# Patient Record
Sex: MALE | Race: WHITE | NOT HISPANIC OR LATINO
[De-identification: names, ages, dates, MRNs, and addresses within clinical notes are randomized per-mention and may not be internally consistent; named-entity substitution may affect disease eponyms.]

---

## 2017-02-23 ENCOUNTER — TRANSCRIPTION ENCOUNTER (OUTPATIENT)
Age: 42
End: 2017-02-23

## 2020-01-30 ENCOUNTER — TRANSCRIPTION ENCOUNTER (OUTPATIENT)
Age: 45
End: 2020-01-30

## 2020-02-22 ENCOUNTER — OUTPATIENT (OUTPATIENT)
Dept: OUTPATIENT SERVICES | Facility: HOSPITAL | Age: 45
LOS: 1 days | End: 2020-02-22

## 2020-04-25 ENCOUNTER — MESSAGE (OUTPATIENT)
Age: 45
End: 2020-04-25

## 2020-05-01 LAB
SARS-COV-2 IGG SERPL IA-ACNC: <0.1 INDEX
SARS-COV-2 IGG SERPL QL IA: NEGATIVE

## 2020-05-05 ENCOUNTER — APPOINTMENT (OUTPATIENT)
Dept: DISASTER EMERGENCY | Facility: CLINIC | Age: 45
End: 2020-05-05

## 2021-05-24 ENCOUNTER — OUTPATIENT (OUTPATIENT)
Dept: OUTPATIENT SERVICES | Facility: HOSPITAL | Age: 46
LOS: 1 days | End: 2021-05-24
Payer: COMMERCIAL

## 2021-05-24 DIAGNOSIS — S83.92XA SPRAIN OF UNSPECIFIED SITE OF LEFT KNEE, INITIAL ENCOUNTER: ICD-10-CM

## 2021-05-24 PROCEDURE — 73721 MRI JNT OF LWR EXTRE W/O DYE: CPT | Mod: 26,LT

## 2021-05-24 PROCEDURE — 73721 MRI JNT OF LWR EXTRE W/O DYE: CPT

## 2021-06-21 ENCOUNTER — OUTPATIENT (OUTPATIENT)
Dept: OUTPATIENT SERVICES | Facility: HOSPITAL | Age: 46
LOS: 1 days | End: 2021-06-21
Payer: COMMERCIAL

## 2021-06-21 VITALS
DIASTOLIC BLOOD PRESSURE: 79 MMHG | WEIGHT: 212.08 LBS | HEIGHT: 69 IN | OXYGEN SATURATION: 97 % | HEART RATE: 72 BPM | TEMPERATURE: 97 F | RESPIRATION RATE: 16 BRPM | SYSTOLIC BLOOD PRESSURE: 110 MMHG

## 2021-06-21 DIAGNOSIS — S83.204D OTHER TEAR OF UNSPECIFIED MENISCUS, CURRENT INJURY, LEFT KNEE, SUBSEQUENT ENCOUNTER: ICD-10-CM

## 2021-06-21 DIAGNOSIS — Z01.818 ENCOUNTER FOR OTHER PREPROCEDURAL EXAMINATION: ICD-10-CM

## 2021-06-21 DIAGNOSIS — S83.282A OTHER TEAR OF LATERAL MENISCUS, CURRENT INJURY, LEFT KNEE, INITIAL ENCOUNTER: ICD-10-CM

## 2021-06-21 LAB
ANION GAP SERPL CALC-SCNC: 13 MMOL/L — SIGNIFICANT CHANGE UP (ref 5–17)
BUN SERPL-MCNC: 18 MG/DL — SIGNIFICANT CHANGE UP (ref 7–23)
CALCIUM SERPL-MCNC: 9.6 MG/DL — SIGNIFICANT CHANGE UP (ref 8.4–10.5)
CHLORIDE SERPL-SCNC: 104 MMOL/L — SIGNIFICANT CHANGE UP (ref 96–108)
CO2 SERPL-SCNC: 20 MMOL/L — LOW (ref 22–31)
CREAT SERPL-MCNC: 0.74 MG/DL — SIGNIFICANT CHANGE UP (ref 0.5–1.3)
GLUCOSE SERPL-MCNC: 106 MG/DL — HIGH (ref 70–99)
HCT VFR BLD CALC: 44.1 % — SIGNIFICANT CHANGE UP (ref 39–50)
HGB BLD-MCNC: 14.8 G/DL — SIGNIFICANT CHANGE UP (ref 13–17)
MCHC RBC-ENTMCNC: 29.6 PG — SIGNIFICANT CHANGE UP (ref 27–34)
MCHC RBC-ENTMCNC: 33.6 GM/DL — SIGNIFICANT CHANGE UP (ref 32–36)
MCV RBC AUTO: 88.2 FL — SIGNIFICANT CHANGE UP (ref 80–100)
NRBC # BLD: 0 /100 WBCS — SIGNIFICANT CHANGE UP (ref 0–0)
PLATELET # BLD AUTO: 213 K/UL — SIGNIFICANT CHANGE UP (ref 150–400)
POTASSIUM SERPL-MCNC: 3.9 MMOL/L — SIGNIFICANT CHANGE UP (ref 3.5–5.3)
POTASSIUM SERPL-SCNC: 3.9 MMOL/L — SIGNIFICANT CHANGE UP (ref 3.5–5.3)
RBC # BLD: 5 M/UL — SIGNIFICANT CHANGE UP (ref 4.2–5.8)
RBC # FLD: 13 % — SIGNIFICANT CHANGE UP (ref 10.3–14.5)
SODIUM SERPL-SCNC: 137 MMOL/L — SIGNIFICANT CHANGE UP (ref 135–145)
WBC # BLD: 4.49 K/UL — SIGNIFICANT CHANGE UP (ref 3.8–10.5)
WBC # FLD AUTO: 4.49 K/UL — SIGNIFICANT CHANGE UP (ref 3.8–10.5)

## 2021-06-21 PROCEDURE — 85027 COMPLETE CBC AUTOMATED: CPT

## 2021-06-21 PROCEDURE — 80048 BASIC METABOLIC PNL TOTAL CA: CPT

## 2021-06-21 PROCEDURE — G0463: CPT

## 2021-06-21 RX ORDER — LIDOCAINE HCL 20 MG/ML
0.2 VIAL (ML) INJECTION ONCE
Refills: 0 | Status: DISCONTINUED | OUTPATIENT
Start: 2021-06-24 | End: 2021-07-09

## 2021-06-21 RX ORDER — SODIUM CHLORIDE 9 MG/ML
3 INJECTION INTRAMUSCULAR; INTRAVENOUS; SUBCUTANEOUS EVERY 8 HOURS
Refills: 0 | Status: DISCONTINUED | OUTPATIENT
Start: 2021-06-24 | End: 2021-07-09

## 2021-06-21 RX ORDER — CHLORHEXIDINE GLUCONATE 213 G/1000ML
1 SOLUTION TOPICAL ONCE
Refills: 0 | Status: DISCONTINUED | OUTPATIENT
Start: 2021-06-24 | End: 2021-07-09

## 2021-06-21 NOTE — H&P PST ADULT - NSICDXFAMILYHX_GEN_ALL_CORE_FT
FAMILY HISTORY:  Father  Still living? Unknown  FH: lung cancer, Age at diagnosis: Age Unknown    Grandparent  Still living? Unknown  FH: lung cancer, Age at diagnosis: Age Unknown

## 2021-06-21 NOTE — H&P PST ADULT - URINARY CATHETER
Triamcinolone last filled 9-8-16 for 30g with 0 refills  Last seen 9-8-16  No future appointment     Ok to refill?   no

## 2021-06-21 NOTE — H&P PST ADULT - NSICDXPROBLEM_GEN_ALL_CORE_FT
PROBLEM DIAGNOSES  Problem: Other tear of lateral meniscus of left knee  Assessment and Plan:        PROBLEM DIAGNOSES  Problem: Other tear of lateral meniscus of left knee  Assessment and Plan: -scheduled left knee arthroscopy, partial meniscectomy on 6/24  -preop instructions given  -soap instructions given  -labs: CBC, BMP done in pst

## 2021-06-21 NOTE — H&P PST ADULT - TEMPERATURE IN CELSIUS (DEGREES C)
-- Message is from the [facility]--    Reason for Call: [phone] [facility] 121.00     Caller Information       Type Contact Phone    05/13/2019 12:41 PM Phone (Incoming) Pietro Coronado (Other) [phone]     \zykia          Alternative phone number:     Turnaround time given to caller:   \"This message will be sent to [state Provider's name]. The clinical team will fulfill your request as soon as they review your message.\"     36.1

## 2021-06-21 NOTE — H&P PST ADULT - HISTORY OF PRESENT ILLNESS
46 yr old male with PMH of  46 yr old male with no pertinent hx, otherwise healthy. Pt c/o left knee pain since October 2020, c/o sharp intermittent pain with increased activity, (6/10, with activity), states he use ibuprofen occasionally for pain control. Pt evaluated by Dr. Awad for a scheduled Left Knee Arthroscopy, partial meniscectomy on 6/24/21. Pt denies recent travel or sick contact.     **Proof of Covid vaccination in chart

## 2021-06-23 ENCOUNTER — TRANSCRIPTION ENCOUNTER (OUTPATIENT)
Age: 46
End: 2021-06-23

## 2021-06-24 ENCOUNTER — OUTPATIENT (OUTPATIENT)
Dept: OUTPATIENT SERVICES | Facility: HOSPITAL | Age: 46
LOS: 1 days | End: 2021-06-24
Payer: COMMERCIAL

## 2021-06-24 VITALS
TEMPERATURE: 99 F | HEART RATE: 80 BPM | SYSTOLIC BLOOD PRESSURE: 108 MMHG | DIASTOLIC BLOOD PRESSURE: 76 MMHG | OXYGEN SATURATION: 98 % | RESPIRATION RATE: 17 BRPM | WEIGHT: 212.08 LBS | HEIGHT: 69 IN

## 2021-06-24 VITALS
SYSTOLIC BLOOD PRESSURE: 126 MMHG | RESPIRATION RATE: 16 BRPM | DIASTOLIC BLOOD PRESSURE: 74 MMHG | TEMPERATURE: 97 F | OXYGEN SATURATION: 100 % | HEART RATE: 79 BPM

## 2021-06-24 DIAGNOSIS — S83.204D OTHER TEAR OF UNSPECIFIED MENISCUS, CURRENT INJURY, LEFT KNEE, SUBSEQUENT ENCOUNTER: ICD-10-CM

## 2021-06-24 PROCEDURE — 29881 ARTHRS KNE SRG MNISECTMY M/L: CPT | Mod: LT

## 2021-06-24 NOTE — ASU DISCHARGE PLAN (ADULT/PEDIATRIC) - NURSING INSTRUCTIONS
You were given Tylenol during your visit, the next dose of Tylenol will be on or after _____10:30 PM______ ,today/tonight and every 6 hours afterwards for pain management, do not take any Tylenol containing products until this time. Do not exceed more than 4000mg of Tylenol in one 24 hour setting. If no contraindications, you may alternate with Ibuprofen or Naproxen 3 hours after dose of Tylenol. Ibuprofen can be taken every 6 hours. Naproxen may be taken every 12 hours.

## 2021-06-24 NOTE — ASU DISCHARGE PLAN (ADULT/PEDIATRIC) - CARE PROVIDER_API CALL
Pro Awad)  Orthopaedic Surgery  [address] [place] [address]  Phone: [phone]  Fax: [phone]  Follow Up Time: 1 week

## 2021-11-18 ENCOUNTER — APPOINTMENT (OUTPATIENT)
Dept: SURGERY | Facility: CLINIC | Age: 46
End: 2021-11-18
Payer: COMMERCIAL

## 2021-11-18 VITALS
HEIGHT: 69 IN | TEMPERATURE: 97.7 F | OXYGEN SATURATION: 97 % | SYSTOLIC BLOOD PRESSURE: 124 MMHG | HEART RATE: 76 BPM | WEIGHT: 210 LBS | RESPIRATION RATE: 16 BRPM | DIASTOLIC BLOOD PRESSURE: 84 MMHG | BODY MASS INDEX: 31.1 KG/M2

## 2021-11-18 DIAGNOSIS — S83.209A UNSPECIFIED TEAR OF UNSPECIFIED MENISCUS, CURRENT INJURY, UNSPECIFIED KNEE, INITIAL ENCOUNTER: ICD-10-CM

## 2021-11-18 DIAGNOSIS — Z78.9 OTHER SPECIFIED HEALTH STATUS: ICD-10-CM

## 2021-11-18 PROCEDURE — 99204 OFFICE O/P NEW MOD 45 MIN: CPT

## 2021-11-18 RX ORDER — MULTIVITAMIN
TABLET ORAL
Refills: 0 | Status: ACTIVE | COMMUNITY

## 2021-11-18 NOTE — HISTORY OF PRESENT ILLNESS
[de-identified] : Kirk is a 45 y/o male here for evaluation of a Left inguinal bulge. Pt had the bulge for the past ten years. Bulge is becoming more painful. Bulge is deducible. Bulge has increased in size. Denies hearing bowel sounds. Denies abdominal pain, constipation. He has never had any imaging done of the area.

## 2021-11-18 NOTE — ASSESSMENT
[FreeTextEntry1] : I have discussed with the patient the surgical options for a bilateral inguinal hernia repair I have told him that in my opinion he should have a bilateral laparoscopic repair.  I told the patient that I do not do this repair but my partner Dr. Hackett does and I suggested he make an appointment to see Dr. Hackett.  The patient is at the present time setting up an appointment to see Dr. Hackett for laparoscopic bilateral inguinal hernia repair

## 2021-11-18 NOTE — CONSULT LETTER
[Dear  ___] : Dear  [unfilled], [Consult Letter:] : I had the pleasure of evaluating your patient, [unfilled]. [Please see my note below.] : Please see my note below. [Sincerely,] : Sincerely, [Consult Closing:] : Thank you very much for allowing me to participate in the care of this patient.  If you have any questions, please do not hesitate to contact me. [FreeTextEntry3] : I have reviewed all the documentation for this encounter with the patient and have edited where appropriate\par \par Dr. Juan M Silva

## 2021-11-18 NOTE — PHYSICAL EXAM
[Normal Breath Sounds] : Normal breath sounds [Normal Heart Sounds] : normal heart sounds [Oriented to Person] : oriented to person [Oriented to Place] : oriented to place [Oriented to Time] : oriented to time [Calm] : calm [Alert] : not alert [de-identified] : There is a moderate size left inguinal hernia present which is reducible and nontender.  Left cord and testicle are normal.  There is a small right inguinal hernia present also the right cord and testicle are normal. [de-identified] : Normal strength and gait

## 2021-11-19 ENCOUNTER — APPOINTMENT (OUTPATIENT)
Dept: SURGERY | Facility: CLINIC | Age: 46
End: 2021-11-19
Payer: COMMERCIAL

## 2021-11-19 PROCEDURE — 99214 OFFICE O/P EST MOD 30 MIN: CPT

## 2021-11-19 PROCEDURE — 99204 OFFICE O/P NEW MOD 45 MIN: CPT

## 2021-11-19 NOTE — REVIEW OF SYSTEMS
[Abdominal Pain] : abdominal pain [Negative] : Heme/Lymph [Vomiting] : no vomiting [Constipation] : no constipation [Diarrhea] : no diarrhea [Heartburn] : no heartburn [Melena] : no melena [Dysuria] : no dysuria [Incontinence] : no incontinence [Hesitancy] : no urinary hesitancy [Nocturia] : no nocturia [Genital Lesion] : no genital lesions [Testicular Pain] : no testicular pain [FreeTextEntry7] : left groin  [FreeTextEntry8] : left groin discomfort

## 2021-11-19 NOTE — REASON FOR VISIT
[Consultation] : a consultation visit [FreeTextEntry1] : Seen Dr. Silva referred for laparoscopic bilateral inguinal hernia repair

## 2021-11-19 NOTE — PHYSICAL EXAM
[Normal Breath Sounds] : Normal breath sounds [Normal Heart Sounds] : normal heart sounds [Normal Rate and Rhythm] : normal rate and rhythm [No HSM] : no hepatosplenomegaly [No Rash or Lesion] : No rash or lesion [Alert] : alert [Oriented to Person] : oriented to person [Oriented to Place] : oriented to place [Oriented to Time] : oriented to time [Calm] : calm [Abdominal Masses] : No abdominal masses [Abdomen Tenderness] : ~T ~M No abdominal tenderness [de-identified] : well nourished,not in distress [de-identified] : anicteric [de-identified] : no LN [de-identified] : n/a [de-identified] : reducible left > right inguinal uncomplicated herniae.b/l testes palpable.No Ing LN  [de-identified] : deferred  [de-identified] : good power and strength

## 2021-11-19 NOTE — ASSESSMENT
[FreeTextEntry1] : 46Y M with B/L inguinal Herniae .\par L Symptomatic Inguinal hernia\par Right Inguinal Hernia[+]\par Has never had a colonoscopy \par Agreeable for a Laparoscopic Bilateral Inguinal Hernia Repair after a screening colonoscopy \par \par Agree with full plan above discussed at length with the patient all of his questions were answered\par Power Hackett MD, FACS, FASMBS\par Chief Division of Minimally Invasive Surgery\par Co-Director of Bariatric Surgery \par [facility] [address]

## 2021-11-19 NOTE — PLAN
[FreeTextEntry1] : SCreening Colonoscopy\par B/L TEP Lap Inguinal Herniae Repairs  after the colonoscopy.\par All his questions answered\par The risks and benefits of the operation have been explained with his full understanding

## 2021-11-19 NOTE — HISTORY OF PRESENT ILLNESS
[de-identified] : Kirk is a 47 y/o male here for consultation for laparoscopic bilateral inguinal hernia. \par He says that he has had this left groin bulge for almost 10 years ,but;has become bothersome recently\par The right groin has always been asymptomatic\par No other h/o systemic symptoms [de-identified] : He was recently seen by our partner.\par No changes in history in  the interim

## 2021-12-29 ENCOUNTER — RESULT REVIEW (OUTPATIENT)
Age: 46
End: 2021-12-29

## 2022-01-25 ENCOUNTER — OUTPATIENT (OUTPATIENT)
Dept: OUTPATIENT SERVICES | Facility: HOSPITAL | Age: 47
LOS: 1 days | End: 2022-01-25
Payer: COMMERCIAL

## 2022-01-25 VITALS
SYSTOLIC BLOOD PRESSURE: 108 MMHG | RESPIRATION RATE: 16 BRPM | HEIGHT: 69 IN | WEIGHT: 220.02 LBS | DIASTOLIC BLOOD PRESSURE: 75 MMHG | TEMPERATURE: 98 F | OXYGEN SATURATION: 95 % | HEART RATE: 76 BPM

## 2022-01-25 DIAGNOSIS — K40.20 BILATERAL INGUINAL HERNIA, WITHOUT OBSTRUCTION OR GANGRENE, NOT SPECIFIED AS RECURRENT: ICD-10-CM

## 2022-01-25 DIAGNOSIS — Z98.890 OTHER SPECIFIED POSTPROCEDURAL STATES: Chronic | ICD-10-CM

## 2022-01-25 DIAGNOSIS — Z01.818 ENCOUNTER FOR OTHER PREPROCEDURAL EXAMINATION: ICD-10-CM

## 2022-01-25 LAB
ANION GAP SERPL CALC-SCNC: 12 MMOL/L — SIGNIFICANT CHANGE UP (ref 5–17)
BUN SERPL-MCNC: 20 MG/DL — SIGNIFICANT CHANGE UP (ref 7–23)
CALCIUM SERPL-MCNC: 9.5 MG/DL — SIGNIFICANT CHANGE UP (ref 8.4–10.5)
CHLORIDE SERPL-SCNC: 103 MMOL/L — SIGNIFICANT CHANGE UP (ref 96–108)
CO2 SERPL-SCNC: 22 MMOL/L — SIGNIFICANT CHANGE UP (ref 22–31)
CREAT SERPL-MCNC: 0.77 MG/DL — SIGNIFICANT CHANGE UP (ref 0.5–1.3)
GLUCOSE SERPL-MCNC: 96 MG/DL — SIGNIFICANT CHANGE UP (ref 70–99)
HCT VFR BLD CALC: 45.1 % — SIGNIFICANT CHANGE UP (ref 39–50)
HGB BLD-MCNC: 15.3 G/DL — SIGNIFICANT CHANGE UP (ref 13–17)
MCHC RBC-ENTMCNC: 29.8 PG — SIGNIFICANT CHANGE UP (ref 27–34)
MCHC RBC-ENTMCNC: 33.9 GM/DL — SIGNIFICANT CHANGE UP (ref 32–36)
MCV RBC AUTO: 87.7 FL — SIGNIFICANT CHANGE UP (ref 80–100)
NRBC # BLD: 0 /100 WBCS — SIGNIFICANT CHANGE UP (ref 0–0)
PLATELET # BLD AUTO: 242 K/UL — SIGNIFICANT CHANGE UP (ref 150–400)
POTASSIUM SERPL-MCNC: 4 MMOL/L — SIGNIFICANT CHANGE UP (ref 3.5–5.3)
POTASSIUM SERPL-SCNC: 4 MMOL/L — SIGNIFICANT CHANGE UP (ref 3.5–5.3)
RBC # BLD: 5.14 M/UL — SIGNIFICANT CHANGE UP (ref 4.2–5.8)
RBC # FLD: 12.6 % — SIGNIFICANT CHANGE UP (ref 10.3–14.5)
SODIUM SERPL-SCNC: 137 MMOL/L — SIGNIFICANT CHANGE UP (ref 135–145)
WBC # BLD: 4.12 K/UL — SIGNIFICANT CHANGE UP (ref 3.8–10.5)
WBC # FLD AUTO: 4.12 K/UL — SIGNIFICANT CHANGE UP (ref 3.8–10.5)

## 2022-01-25 PROCEDURE — G0463: CPT

## 2022-01-25 PROCEDURE — 85027 COMPLETE CBC AUTOMATED: CPT

## 2022-01-25 PROCEDURE — 80048 BASIC METABOLIC PNL TOTAL CA: CPT

## 2022-01-25 RX ORDER — SODIUM CHLORIDE 9 MG/ML
1000 INJECTION, SOLUTION INTRAVENOUS
Refills: 0 | Status: DISCONTINUED | OUTPATIENT
Start: 2022-02-03 | End: 2022-02-17

## 2022-01-25 RX ORDER — SODIUM CHLORIDE 9 MG/ML
3 INJECTION INTRAMUSCULAR; INTRAVENOUS; SUBCUTANEOUS EVERY 8 HOURS
Refills: 0 | Status: DISCONTINUED | OUTPATIENT
Start: 2022-02-03 | End: 2022-02-17

## 2022-01-25 RX ORDER — LIDOCAINE HCL 20 MG/ML
0.2 VIAL (ML) INJECTION ONCE
Refills: 0 | Status: DISCONTINUED | OUTPATIENT
Start: 2022-02-03 | End: 2022-02-17

## 2022-01-25 NOTE — H&P PST ADULT - MUSCULOSKELETAL
detailed exam negative ROM intact/no joint swelling/no joint erythema/no joint warmth/no calf tenderness/normal strength

## 2022-01-25 NOTE — H&P PST ADULT - PROBLEM SELECTOR PLAN 1
Laparoscopic bilateral Inguinal Hernia Repair  -cbc, bmp done at [facility]  -medical evaluation --scheduled 1/25  -preop instructions given

## 2022-01-25 NOTE — H&P PST ADULT - URINARY CATHETER
no US w/ chronic dvt, pt recalls hx of dvt in the past.  inquired about lasix regimen, instructed pt to follow up with PMD regarding possibly restarting lasix V-Y Plasty Text: The defect edges were debeveled with a #15 scalpel blade.  Given the location of the defect, shape of the defect and the proximity to free margins an V-Y advancement flap was deemed most appropriate.  Using a sterile surgical marker, an appropriate advancement flap was drawn incorporating the defect and placing the expected incisions within the relaxed skin tension lines where possible.    The area thus outlined was incised deep to adipose tissue with a #15 scalpel blade.  The skin margins were undermined to an appropriate distance in all directions utilizing iris scissors.

## 2022-01-25 NOTE — H&P PST ADULT - FALL HARM RISK - UNIVERSAL INTERVENTIONS
Bed in lowest position, wheels locked, appropriate side rails in place/Call bell, personal items and telephone in reach/Instruct patient to call for assistance before getting out of bed or chair/Non-slip footwear when patient is out of bed/[place] to call system/Physically safe environment - no spills, clutter or unnecessary equipment/Purposeful Proactive Rounding/Room/bathroom lighting operational, light cord in reach

## 2022-01-25 NOTE — H&P PST ADULT - HISTORY OF PRESENT ILLNESS
46 year old male with PMH of Diverticulitis in 2019 with complaint of left groin bulge for 10 years and most recently noted right groin bulge. He endorses that     preop covid swab 1/31 @ [facility] 46 year old male with PMH of Diverticulosis/Diverticulitis with complaint of left groin bulge for 10 years and most recently noted right groin bulge. He endorses that the right groin hernia is asymptomatic, however the left groin bulge has become bothersome over the last several months. He denies fever, chills, nausea/vomiting, abdominal pain, unintentional changes in weight, dark or bloody stools, he also denies prior COVID-19 infection. He presents to PST today for evaluation prior to scheduled bilateral Inguinal Hernia Repair on 2/3/2022.    preop covid swab 1/31 @ [facility]

## 2022-01-25 NOTE — H&P PST ADULT - NSICDXPASTMEDICALHX_GEN_ALL_CORE_FT
PAST MEDICAL HISTORY:  Left knee pain      PAST MEDICAL HISTORY:  Bilateral inguinal hernia     Diverticulitis -2019    Left knee pain

## 2022-01-26 PROBLEM — K40.20 BILATERAL INGUINAL HERNIA, WITHOUT OBSTRUCTION OR GANGRENE, NOT SPECIFIED AS RECURRENT: Chronic | Status: ACTIVE | Noted: 2022-01-25

## 2022-01-26 PROBLEM — K57.92 DIVERTICULITIS OF INTESTINE, PART UNSPECIFIED, WITHOUT PERFORATION OR ABSCESS WITHOUT BLEEDING: Chronic | Status: ACTIVE | Noted: 2022-01-25

## 2022-01-26 PROBLEM — M25.562 PAIN IN LEFT KNEE: Chronic | Status: ACTIVE | Noted: 2021-06-21

## 2022-01-31 ENCOUNTER — OUTPATIENT (OUTPATIENT)
Dept: OUTPATIENT SERVICES | Facility: HOSPITAL | Age: 47
LOS: 1 days | End: 2022-01-31
Payer: COMMERCIAL

## 2022-01-31 DIAGNOSIS — Z11.52 ENCOUNTER FOR SCREENING FOR COVID-19: ICD-10-CM

## 2022-01-31 DIAGNOSIS — Z98.890 OTHER SPECIFIED POSTPROCEDURAL STATES: Chronic | ICD-10-CM

## 2022-01-31 LAB — SARS-COV-2 RNA SPEC QL NAA+PROBE: SIGNIFICANT CHANGE UP

## 2022-01-31 PROCEDURE — U0003: CPT

## 2022-01-31 PROCEDURE — U0005: CPT

## 2022-01-31 PROCEDURE — C9803: CPT

## 2022-02-02 ENCOUNTER — TRANSCRIPTION ENCOUNTER (OUTPATIENT)
Age: 47
End: 2022-02-02

## 2022-02-03 ENCOUNTER — OUTPATIENT (OUTPATIENT)
Dept: OUTPATIENT SERVICES | Facility: HOSPITAL | Age: 47
LOS: 1 days | End: 2022-02-03
Payer: COMMERCIAL

## 2022-02-03 ENCOUNTER — APPOINTMENT (OUTPATIENT)
Dept: SURGERY | Facility: HOSPITAL | Age: 47
End: 2022-02-03
Payer: COMMERCIAL

## 2022-02-03 VITALS
HEART RATE: 79 BPM | DIASTOLIC BLOOD PRESSURE: 78 MMHG | TEMPERATURE: 98 F | SYSTOLIC BLOOD PRESSURE: 121 MMHG | HEIGHT: 69 IN | OXYGEN SATURATION: 97 % | WEIGHT: 220.02 LBS | RESPIRATION RATE: 16 BRPM

## 2022-02-03 VITALS
OXYGEN SATURATION: 95 % | TEMPERATURE: 97 F | RESPIRATION RATE: 18 BRPM | DIASTOLIC BLOOD PRESSURE: 62 MMHG | HEART RATE: 60 BPM | SYSTOLIC BLOOD PRESSURE: 105 MMHG

## 2022-02-03 DIAGNOSIS — K40.20 BILATERAL INGUINAL HERNIA, WITHOUT OBSTRUCTION OR GANGRENE, NOT SPECIFIED AS RECURRENT: ICD-10-CM

## 2022-02-03 DIAGNOSIS — Z98.890 OTHER SPECIFIED POSTPROCEDURAL STATES: Chronic | ICD-10-CM

## 2022-02-03 PROCEDURE — 49650 LAP ING HERNIA REPAIR INIT: CPT | Mod: 50

## 2022-02-03 PROCEDURE — C9399: CPT

## 2022-02-03 PROCEDURE — C1727: CPT

## 2022-02-03 PROCEDURE — C1889: CPT

## 2022-02-03 PROCEDURE — 49650 LAP ING HERNIA REPAIR INIT: CPT | Mod: 82

## 2022-02-03 PROCEDURE — C1781: CPT

## 2022-02-03 DEVICE — MESH HERNIA INGUINAL PROGRIP LAPAROSCOPIC 15 X 10CM RIGHT: Type: IMPLANTABLE DEVICE | Site: BILATERAL | Status: FUNCTIONAL

## 2022-02-03 DEVICE — DISSECTION BALLOON COVIDIEN PDB KIDNEY SHAPE: Type: IMPLANTABLE DEVICE | Site: BILATERAL | Status: FUNCTIONAL

## 2022-02-03 DEVICE — MESH HERNIA INGUINAL PROGRIP LAPAROSCOPIC 15 X 10CM LEFT: Type: IMPLANTABLE DEVICE | Site: BILATERAL | Status: FUNCTIONAL

## 2022-02-03 DEVICE — DEVICE ABSORBATACK 5MM 30 TACK FIXATION: Type: IMPLANTABLE DEVICE | Site: BILATERAL | Status: FUNCTIONAL

## 2022-02-03 DEVICE — DEVICE SORBAFIX ABSORBABLE FIXATION 30 DISP: Type: IMPLANTABLE DEVICE | Site: BILATERAL | Status: FUNCTIONAL

## 2022-02-03 RX ORDER — CHLORHEXIDINE GLUCONATE 213 G/1000ML
1 SOLUTION TOPICAL ONCE
Refills: 0 | Status: COMPLETED | OUTPATIENT
Start: 2022-02-03 | End: 2022-02-03

## 2022-02-03 RX ORDER — SODIUM CHLORIDE 9 MG/ML
1000 INJECTION, SOLUTION INTRAVENOUS
Refills: 0 | Status: DISCONTINUED | OUTPATIENT
Start: 2022-02-03 | End: 2022-02-17

## 2022-02-03 RX ORDER — ONDANSETRON 8 MG/1
4 TABLET, FILM COATED ORAL ONCE
Refills: 0 | Status: DISCONTINUED | OUTPATIENT
Start: 2022-02-03 | End: 2022-02-17

## 2022-02-03 RX ORDER — CEFAZOLIN SODIUM 1 G
2000 VIAL (EA) INJECTION ONCE
Refills: 0 | Status: COMPLETED | OUTPATIENT
Start: 2022-02-03 | End: 2022-02-03

## 2022-02-03 RX ORDER — HYDROMORPHONE HYDROCHLORIDE 2 MG/ML
0.5 INJECTION INTRAMUSCULAR; INTRAVENOUS; SUBCUTANEOUS
Refills: 0 | Status: DISCONTINUED | OUTPATIENT
Start: 2022-02-03 | End: 2022-02-03

## 2022-02-03 RX ORDER — OXYCODONE 5 MG/1
5 TABLET ORAL
Qty: 7 | Refills: 0 | Status: ACTIVE | COMMUNITY
Start: 2022-02-03 | End: 1900-01-01

## 2022-02-03 RX ADMIN — HYDROMORPHONE HYDROCHLORIDE 0.5 MILLIGRAM(S): 2 INJECTION INTRAMUSCULAR; INTRAVENOUS; SUBCUTANEOUS at 13:49

## 2022-02-03 RX ADMIN — SODIUM CHLORIDE 100 MILLILITER(S): 9 INJECTION, SOLUTION INTRAVENOUS at 08:22

## 2022-02-03 RX ADMIN — CHLORHEXIDINE GLUCONATE 1 APPLICATION(S): 213 SOLUTION TOPICAL at 08:21

## 2022-02-03 RX ADMIN — HYDROMORPHONE HYDROCHLORIDE 0.5 MILLIGRAM(S): 2 INJECTION INTRAMUSCULAR; INTRAVENOUS; SUBCUTANEOUS at 13:57

## 2022-02-03 NOTE — ASU PATIENT PROFILE, ADULT - FALL HARM RISK - UNIVERSAL INTERVENTIONS
Bed in lowest position, wheels locked, appropriate side rails in place/Call bell, personal items and telephone in reach/Instruct patient to call for assistance before getting out of bed or chair/Non-slip footwear when patient is out of bed/[place] to call system/Physically safe environment - no spills, clutter or unnecessary equipment/Purposeful Proactive Rounding/Room/bathroom lighting operational, light cord in reach

## 2022-02-03 NOTE — BRIEF OPERATIVE NOTE - NSICDXBRIEFPROCEDURE_GEN_ALL_CORE_FT
PROCEDURES:  Laparoscopic repair, inguinal hernia, direct, bilateral, with prosthesis 03-Feb-2022 13:34:03  Mikhail Bernabe

## 2022-02-03 NOTE — ASU DISCHARGE PLAN (ADULT/PEDIATRIC) - NURSING INSTRUCTIONS
Tylenol 1000 mg given at 10.45 am today- take as needed. pls see manufacturers dosing for time of next dose

## 2022-02-03 NOTE — ASU DISCHARGE PLAN (ADULT/PEDIATRIC) - CARE PROVIDER_API CALL
Power Hackett)  Surgery  [address], [address]  [place] [address]  Phone: [phone]  Fax: [phone]  Follow Up Time:

## 2022-02-03 NOTE — BRIEF OPERATIVE NOTE - NSICDXBRIEFPREOP_GEN_ALL_CORE_FT
PRE-OP DIAGNOSIS:  Direct inguinal hernia of left side 03-Feb-2022 13:34:28  Mikhail Bernabe  Reducible right inguinal hernia 03-Feb-2022 13:34:54  Mikhail Bernabe

## 2022-02-03 NOTE — ASU PREOP CHECKLIST - AS TEMP SITE
LAG < 7 Survey      Responses   [facility] facility patient discharged from?  [place]   Does the patient have one of the following disease processes/diagnoses(primary or secondary)?  Other   BHLAG <7 Attempt successful?  No   Unsuccessful attempts  Attempt 3          Natalya Rosenberg RN   forehead

## 2022-02-03 NOTE — ASU DISCHARGE PLAN (ADULT/PEDIATRIC) - CALL YOUR DOCTOR IF YOU HAVE ANY OF THE FOLLOWING:
Bleeding that does not stop/Swelling that gets worse/Pain not relieved by Medications/Wound/Surgical Site with redness, or foul smelling discharge or pus/Nausea and vomiting that does not stop/Unable to urinate

## 2022-02-03 NOTE — ASU DISCHARGE PLAN (ADULT/PEDIATRIC) - NS MD DC FALL RISK RISK
For information on Fall & Injury Prevention, visit: [email or web address] OR  [email or web address] OR  https://www.cdc.gov/steadi/patient.html

## 2022-02-18 ENCOUNTER — APPOINTMENT (OUTPATIENT)
Dept: SURGERY | Facility: CLINIC | Age: 47
End: 2022-02-18
Payer: COMMERCIAL

## 2022-02-18 VITALS
TEMPERATURE: 97.7 F | RESPIRATION RATE: 16 BRPM | SYSTOLIC BLOOD PRESSURE: 117 MMHG | OXYGEN SATURATION: 95 % | HEART RATE: 74 BPM | DIASTOLIC BLOOD PRESSURE: 82 MMHG

## 2022-02-18 PROCEDURE — 99024 POSTOP FOLLOW-UP VISIT: CPT

## 2022-02-18 NOTE — ASSESSMENT
[FreeTextEntry1] : 24Y M 2 weeks S/P B/L TEP hernioplasty for Inguinal herniae\par Presents with a palpable swelling in the right groin\par All questions answered.\par

## 2022-02-18 NOTE — PLAN
[FreeTextEntry1] : Left Scrotal Inguinal USS\par RTN after \par \par Power Hackett MD, FACS, FASMBS\par Chief Division of Minimally Invasive Surgery\par Co-Director of Bariatric Surgery \par [facility]\[place] [address]

## 2022-02-18 NOTE — REVIEW OF SYSTEMS
[As Noted in HPI] : as noted in HPI [Negative] : Heme/Lymph [Dysuria] : no dysuria [Incontinence] : no incontinence [Hesitancy] : no urinary hesitancy [Nocturia] : no nocturia [Genital Lesion] : no genital lesions [Testicular Pain] : no testicular pain

## 2022-02-18 NOTE — PHYSICAL EXAM
[Normal Breath Sounds] : Normal breath sounds [Normal Heart Sounds] : normal heart sounds [Normal Rate and Rhythm] : normal rate and rhythm [No HSM] : no hepatosplenomegaly [No Rash or Lesion] : No rash or lesion [Alert] : alert [Oriented to Person] : oriented to person [Oriented to Place] : oriented to place [Oriented to Time] : oriented to time [Calm] : calm [Abdominal Masses] : No abdominal masses [Abdomen Tenderness] : ~T ~M No abdominal tenderness [de-identified] : well nourished,not in distress [de-identified] : anicteric [de-identified] : no LN [de-identified] : n/a [de-identified] : Left 2x2 cm swelling at the Superficial Inguinal ring\par CDI incisions \par .b/l testes palpable.\par No Ing LN  [de-identified] : deferred  [de-identified] : good power and strength

## 2022-02-18 NOTE — HISTORY OF PRESENT ILLNESS
[de-identified] : Kirk is a 46 year old male here for post op visit. S/p laparoscopic preperitoneal repair of bilateral inguinal hernia with ProGrip mesh on 2/3/22. [de-identified] : Right Inguinal Area: Disconfort\par Left Inguinal Area: Swelling\par Nml Bladder and bowel habits\par Dona PO\par

## 2022-02-22 ENCOUNTER — APPOINTMENT (OUTPATIENT)
Dept: ULTRASOUND IMAGING | Facility: CLINIC | Age: 47
End: 2022-02-22

## 2022-02-24 DIAGNOSIS — K40.20 BILATERAL INGUINAL HERNIA, WITHOUT OBSTRUCTION OR GANGRENE, NOT SPECIFIED AS RECURRENT: ICD-10-CM

## 2022-02-25 ENCOUNTER — APPOINTMENT (OUTPATIENT)
Dept: SURGERY | Facility: CLINIC | Age: 47
End: 2022-02-25
Payer: COMMERCIAL

## 2022-02-25 VITALS
HEART RATE: 103 BPM | RESPIRATION RATE: 17 BRPM | SYSTOLIC BLOOD PRESSURE: 138 MMHG | TEMPERATURE: 95.4 F | OXYGEN SATURATION: 96 % | DIASTOLIC BLOOD PRESSURE: 88 MMHG

## 2022-02-25 PROCEDURE — 99024 POSTOP FOLLOW-UP VISIT: CPT

## 2022-02-25 NOTE — ASSESSMENT
[FreeTextEntry1] : 46-year-old male status post laparoscopic preperitoneal repair of inguinal hernia we have aspirated approximately 30 cc of serosanguineous fluid from the direct space he tolerated the procedure very well he will follow-up in 3 weeks all of his questions were answered

## 2022-02-25 NOTE — PHYSICAL EXAM
[Normal Breath Sounds] : Normal breath sounds [Normal Heart Sounds] : normal heart sounds [de-identified] : Ambulating without difficulty [de-identified] : Within normal limits [de-identified] : Soft nontender nondistended [de-identified] : Thin normal limits [de-identified] : Small golf ball size seroma in the left groin.  Infiltration of lidocaine followed by aspiration of approximately 32 cc of serosanguineous fluid.  Patient tolerated the procedure very well after the procedure the golf ball sized seroma was completely deflated.

## 2022-02-25 NOTE — PHYSICAL EXAM
[Normal Breath Sounds] : Normal breath sounds [Normal Heart Sounds] : normal heart sounds [de-identified] : Ambulating without difficulty [de-identified] : Within normal limits [de-identified] : Thin normal limits [de-identified] : Soft nontender nondistended [de-identified] : Small golf ball size seroma in the left groin.  Infiltration of lidocaine followed by aspiration of approximately 32 cc of serosanguineous fluid.  Patient tolerated the procedure very well after the procedure the golf ball sized seroma was completely deflated.

## 2022-02-25 NOTE — HISTORY OF PRESENT ILLNESS
[de-identified] : Kirk is a 46 year old male here for post op visit. S/p laparoscopic preperitoneal repair of bilateral inguinal hernia with ProGrip mesh on 2/3/22. \par US results : Ultrasound reveals seroma after laparoscopic bilateral inguinal hernia repair this was a direct hernia.  He is back for aspiration of his seroma

## 2022-02-25 NOTE — HISTORY OF PRESENT ILLNESS
[de-identified] : Kirk is a 46 year old male here for post op visit. S/p laparoscopic preperitoneal repair of bilateral inguinal hernia with ProGrip mesh on 2/3/22. \par US results : Ultrasound reveals seroma after laparoscopic bilateral inguinal hernia repair this was a direct hernia.  He is back for aspiration of his seroma

## 2022-03-21 ENCOUNTER — APPOINTMENT (OUTPATIENT)
Dept: SURGERY | Facility: CLINIC | Age: 47
End: 2022-03-21
Payer: COMMERCIAL

## 2022-03-21 VITALS
RESPIRATION RATE: 16 BRPM | HEART RATE: 72 BPM | DIASTOLIC BLOOD PRESSURE: 85 MMHG | OXYGEN SATURATION: 98 % | TEMPERATURE: 97.9 F | SYSTOLIC BLOOD PRESSURE: 125 MMHG

## 2022-03-21 DIAGNOSIS — K40.20 BILATERAL INGUINAL HERNIA, W/OUT OBSTRUCTION OR GANGRENE, NOT SPECIFIED AS RECURRENT: ICD-10-CM

## 2022-03-21 DIAGNOSIS — G89.18 OTHER ACUTE POSTPROCEDURAL PAIN: ICD-10-CM

## 2022-03-21 PROCEDURE — 99024 POSTOP FOLLOW-UP VISIT: CPT

## 2022-03-21 NOTE — HISTORY OF PRESENT ILLNESS
[de-identified] : Kirk is a 46 year old male here for post op visit. S/p laparoscopic preperitoneal repair of bilateral inguinal hernia with ProGrip mesh on 2/3/22.  Patient is doing very well the small seroma is still present but much smaller he has no tenderness and the floor is intact

## 2022-03-21 NOTE — ASSESSMENT
[FreeTextEntry1] : Seroma improved we have decided not to reaspirate he will follow-up in 2 or 3 months I would expect it to be fairly small by then he will return either in 3 months or as needed all of his questions were answered

## 2022-03-21 NOTE — PHYSICAL EXAM
[JVD] : no jugular venous distention  [Normal Breath Sounds] : Normal breath sounds [Normal Heart Sounds] : normal heart sounds [de-identified] : Ambulating without difficulty [de-identified] : Within normal limits [de-identified] : Soft nontender nondistended no hernias or masses are present small 3 cm seroma residual from previous aspiration [de-identified] : Within normal limits

## 2022-07-19 NOTE — H&P PST ADULT - FALLEN IN THE PAST
no Ftsg Text: The defect edges were debeveled with a #15c scalpel blade.  Given the location of the defect, shape of the defect and the proximity to free margins a full thickness skin graft was deemed most appropriate.  Using a sterile surgical marker, the primary defect shape was transferred to the donor site. The area thus outlined was incised deep to adipose tissue with a #15c scalpel blade.  The harvested graft was then trimmed of adipose tissue until only dermis and epidermis was left.  The skin margins of the secondary defect were undermined to an appropriate distance in all directions utilizing iris scissors.  The secondary defect was closed with interrupted buried subcutaneous sutures.  The skin edges were then re-apposed with running  sutures.  The skin graft was then placed in the primary defect and oriented appropriately.

## 2022-11-16 ENCOUNTER — NON-APPOINTMENT (OUTPATIENT)
Age: 47
End: 2022-11-16

## (undated) DEVICE — TROCAR COVIDIEN BLUNT TIP HASSAN 10MM STANDARD

## (undated) DEVICE — MEDICATION LABELS W MARKER

## (undated) DEVICE — SUT MONOCRYL 4-0 27" PS-2 UNDYED

## (undated) DEVICE — WARMING BLANKET UPPER ADULT

## (undated) DEVICE — SUT POLYSORB 0 36" GU-46

## (undated) DEVICE — TROCAR COVIDIEN BLUNT TIP HASSAN 10MM

## (undated) DEVICE — TROCAR COVIDIEN BLUNT TIP HASSAN 12MM

## (undated) DEVICE — SOL IRR POUR NS 0.9% 500ML

## (undated) DEVICE — PACK GENERAL LAPAROSCOPY

## (undated) DEVICE — VENODYNE/SCD SLEEVE CALF MEDIUM

## (undated) DEVICE — TROCAR COVIDIEN VERSAPORT BLADELESS OPTICAL 5MM STANDARD

## (undated) DEVICE — VALVE YELLOW PORT SEAL PLUS 5MM

## (undated) DEVICE — MARKING PEN W RULER

## (undated) DEVICE — DRSG TEGADERM + PAD 3.5X4"

## (undated) DEVICE — DISSECTOR ENDO PEANUT 5MM

## (undated) DEVICE — LIGASURE MARYLAND 37CM

## (undated) DEVICE — INSUFFLATION NDL COVIDIEN STEP 14G SHORT FOR STEP/VERSASTEP

## (undated) DEVICE — PREP CHLORAPREP HI-LITE ORANGE 26ML

## (undated) DEVICE — PREP BETADINE SPONGE STICKS

## (undated) DEVICE — CATH IV SAFE INSYTE 14G X 1.75" (ORANGE)

## (undated) DEVICE — DRSG MASTISOL

## (undated) DEVICE — CATH ANGIO 14G X 3.25"

## (undated) DEVICE — DRAPE INSTRUMENT POUCH 6.75" X 11"

## (undated) DEVICE — NDL HYPO REGULAR BEVEL 22G X 1.5" (TURQUOISE)

## (undated) DEVICE — DRSG STERISTRIPS 0.5 X 4"

## (undated) DEVICE — SUT BIOSYN 4-0 18" P-12

## (undated) DEVICE — GLV 7.5 PROTEXIS (WHITE)

## (undated) DEVICE — SOL IRR POUR H2O 250ML

## (undated) DEVICE — TUBING IRRIGATION DAVOL SYSTEM X STREAM

## (undated) DEVICE — SUT POLYSORB 0 30" GS-23

## (undated) DEVICE — D HELP - CLEARVIEW CLEARIFY SYSTEM

## (undated) DEVICE — GLV 8 PROTEXIS (WHITE)

## (undated) DEVICE — TUBING INSUFFLATION LAP FILTER 10FT

## (undated) DEVICE — FOLEY TRAY 16FR LF URINE METER SURESTEP

## (undated) DEVICE — DRAPE MAYO STAND 23"

## (undated) DEVICE — BLADE SCALPEL SAFETYLOCK #15